# Patient Record
Sex: MALE | Race: BLACK OR AFRICAN AMERICAN | Employment: FULL TIME | URBAN - METROPOLITAN AREA
[De-identification: names, ages, dates, MRNs, and addresses within clinical notes are randomized per-mention and may not be internally consistent; named-entity substitution may affect disease eponyms.]

---

## 2018-05-08 ENCOUNTER — HOSPITAL ENCOUNTER (EMERGENCY)
Age: 23
Discharge: LEFT BEFORE DISCHARGE | End: 2018-05-08
Attending: EMERGENCY MEDICINE
Payer: SELF-PAY

## 2018-05-08 VITALS
HEART RATE: 86 BPM | TEMPERATURE: 98.6 F | OXYGEN SATURATION: 99 % | SYSTOLIC BLOOD PRESSURE: 152 MMHG | RESPIRATION RATE: 16 BRPM | DIASTOLIC BLOOD PRESSURE: 89 MMHG | WEIGHT: 190 LBS

## 2018-05-08 PROCEDURE — 75810000275 HC EMERGENCY DEPT VISIT NO LEVEL OF CARE

## 2018-05-08 PROCEDURE — 93005 ELECTROCARDIOGRAM TRACING: CPT

## 2018-05-10 LAB
ATRIAL RATE: 92 BPM
CALCULATED P AXIS, ECG09: 56 DEGREES
CALCULATED R AXIS, ECG10: 45 DEGREES
CALCULATED T AXIS, ECG11: 44 DEGREES
DIAGNOSIS, 93000: NORMAL
P-R INTERVAL, ECG05: 164 MS
Q-T INTERVAL, ECG07: 340 MS
QRS DURATION, ECG06: 96 MS
QTC CALCULATION (BEZET), ECG08: 420 MS
VENTRICULAR RATE, ECG03: 92 BPM

## 2022-08-29 ENCOUNTER — HOSPITAL ENCOUNTER (EMERGENCY)
Age: 27
Discharge: HOME | End: 2022-08-29
Attending: STUDENT IN AN ORGANIZED HEALTH CARE EDUCATION/TRAINING PROGRAM
Payer: COMMERCIAL

## 2022-08-29 VITALS
OXYGEN SATURATION: 100 % | BODY MASS INDEX: 27.17 KG/M2 | TEMPERATURE: 98.4 F | DIASTOLIC BLOOD PRESSURE: 81 MMHG | RESPIRATION RATE: 16 BRPM | WEIGHT: 205 LBS | SYSTOLIC BLOOD PRESSURE: 143 MMHG | HEIGHT: 73 IN | HEART RATE: 77 BPM

## 2022-08-29 DIAGNOSIS — L03.213 PRESEPTAL CELLULITIS OF LEFT EYE: Primary | ICD-10-CM

## 2022-08-29 PROCEDURE — 99283 EMERGENCY DEPT VISIT LOW MDM: CPT

## 2022-08-29 RX ORDER — SULFAMETHOXAZOLE AND TRIMETHOPRIM 800; 160 MG/1; MG/1
1 TABLET ORAL 2 TIMES DAILY
Qty: 14 TABLET | Refills: 0 | Status: SHIPPED | OUTPATIENT
Start: 2022-08-29 | End: 2022-09-05

## 2022-08-29 RX ORDER — PROPARACAINE HYDROCHLORIDE 5 MG/ML
1 SOLUTION/ DROPS OPHTHALMIC
Status: DISCONTINUED | OUTPATIENT
Start: 2022-08-29 | End: 2022-08-29 | Stop reason: HOSPADM

## 2022-08-29 RX ORDER — AMOXICILLIN AND CLAVULANATE POTASSIUM 875; 125 MG/1; MG/1
1 TABLET, FILM COATED ORAL 2 TIMES DAILY
Qty: 14 TABLET | Refills: 0 | Status: SHIPPED | OUTPATIENT
Start: 2022-08-29 | End: 2022-09-05

## 2022-08-29 NOTE — ED TRIAGE NOTES
Pt presents with swelling to left eye since last night. Unsure if he was bitten by an insect. Believes the swelling is due to the pain & inflammation from his wisdom tooth; appt with a dentist on 9/14. Has not been taking Benadryl but has taken Tylenol & took one Amoxicillin tablet from his mother. Stated he had an eye issue recently & that caused blurry vision but unrelated to today's complaint.

## 2022-08-29 NOTE — DISCHARGE INSTRUCTIONS
Use ice on for 10 minutes off for 30 minutes as needed to help with swelling in your eye.   Follow-up with your ophthalmologist.

## 2022-08-29 NOTE — ED PROVIDER NOTES
HPI   Patient is a 48-year-old male who presents with a 1 day history of eye pain and swelling. He denies any changes in his vision. He does have current problems with blurry vision to his left eye with astigmatism that he is following with an ophthalmologist with. Over the last day he has noticed worsening swelling in his eye. He denies any known bug bite or lesion. Denies any trauma or injury. Denies any pain when he moves his eye around or changes in his vision. Does not have any fever, sweats or chills. No past medical history on file. No past surgical history on file. No family history on file. Social History     Socioeconomic History    Marital status: SINGLE     Spouse name: Not on file    Number of children: Not on file    Years of education: Not on file    Highest education level: Not on file   Occupational History    Not on file   Tobacco Use    Smoking status: Not on file    Smokeless tobacco: Not on file   Substance and Sexual Activity    Alcohol use: Not on file    Drug use: Not on file    Sexual activity: Not on file   Other Topics Concern    Not on file   Social History Narrative    Not on file     Social Determinants of Health     Financial Resource Strain: Not on file   Food Insecurity: Not on file   Transportation Needs: Not on file   Physical Activity: Not on file   Stress: Not on file   Social Connections: Not on file   Intimate Partner Violence: Not on file   Housing Stability: Not on file         ALLERGIES: Patient has no known allergies.     Review of Systems  Constitutional: No fever  HENT: No ear pain  Eyes: No change in vision  Respiratory: No SOB  Cardio: No chest pain  GI: No blood in stool  : No hematuria  MSK: No back pain  Skin: No rashes  Neuro: No headache    Vitals:    08/29/22 1908   BP: (!) 143/81   Pulse: 77   Resp: 16   Temp: 98.4 °F (36.9 °C)   SpO2: 100%   Weight: 93 kg (205 lb)   Height: 6' 1\" (1.854 m)            Physical Exam   General: No acute distress  Head: Normocephalic, atraumatic  Psych: Cooperative and alert  Eyes: Extraocular motion intact, no conjunctivitis or changes to the eye, pupil reactive equally, obvious swelling noted to upper and lower lids of left eye with some swelling extended onto the bridge of the nose, no obvious breakdown in skin. Right eye within normal limits. ENT: Moist oral mucosa  Neck: Supple  CV: Regular rate and rhythm, no pitting edema, palpable radial pulses  Pulm: Clear breath sounds bilaterally without any wheezing or rhonchi, normal respiratory rate  GI: Normal bowel sounds, soft, non-tender  MSK: Moves all four extremities  Skin: No rashes  Neuro: Alert and conversive    [facility]  Patient is a 79-year-old male who presents with swelling to his left eye. Could be concerning for a preseptal cellulitis however does not have any concerning findings of a septal infection at this time. Did also discuss this could be reactionary to something like a bug bite or medications I would recommend completion of antibiotics and close follow-up with ophthalmology. Patient stable for discharge at this time. Patient is in agreement with the plan to be discharged at this time. All the patient's questions were answered. Patient was given written instructions on the diagnosis, and states understanding of the plan moving forward. We did discuss important signs and symptoms that should prompt quick return to the emergency department. Disposition: Patient was discharged home in stable condition.   They will follow up with ophthalmology    Prescriptions: Augmentin and Bactrim    Diagnosis: Preseptal cellulitis of left eye       Procedures

## 2025-07-18 ENCOUNTER — HOSPITAL ENCOUNTER (EMERGENCY)
Age: 30
Discharge: LEFT BEFORE DISCHARGE | End: 2025-07-18

## 2025-07-18 VITALS
HEART RATE: 58 BPM | OXYGEN SATURATION: 100 % | SYSTOLIC BLOOD PRESSURE: 150 MMHG | BODY MASS INDEX: 36.16 KG/M2 | RESPIRATION RATE: 18 BRPM | DIASTOLIC BLOOD PRESSURE: 82 MMHG | TEMPERATURE: 97.3 F | HEIGHT: 66 IN | WEIGHT: 225 LBS

## 2025-07-18 DIAGNOSIS — H54.62 DECREASED VISION OF LEFT EYE: ICD-10-CM

## 2025-07-18 DIAGNOSIS — H20.052 HYPOPYON OF LEFT EYE: Primary | ICD-10-CM

## 2025-07-18 PROCEDURE — 6360000002 HC RX W HCPCS: Performed by: PHYSICIAN ASSISTANT

## 2025-07-18 PROCEDURE — 90471 IMMUNIZATION ADMIN: CPT | Performed by: PHYSICIAN ASSISTANT

## 2025-07-18 PROCEDURE — 6370000000 HC RX 637 (ALT 250 FOR IP): Performed by: PHYSICIAN ASSISTANT

## 2025-07-18 PROCEDURE — 2500000003 HC RX 250 WO HCPCS: Performed by: PHYSICIAN ASSISTANT

## 2025-07-18 PROCEDURE — 90715 TDAP VACCINE 7 YRS/> IM: CPT | Performed by: PHYSICIAN ASSISTANT

## 2025-07-18 PROCEDURE — 99284 EMERGENCY DEPT VISIT MOD MDM: CPT

## 2025-07-18 RX ORDER — FLUORESCEIN SODIUM 1 MG/MG
1 STRIP OPHTHALMIC
Status: DISCONTINUED | OUTPATIENT
Start: 2025-07-18 | End: 2025-07-19 | Stop reason: HOSPADM

## 2025-07-18 RX ORDER — PROPARACAINE HYDROCHLORIDE 5 MG/ML
1 SOLUTION/ DROPS OPHTHALMIC
Status: COMPLETED | OUTPATIENT
Start: 2025-07-18 | End: 2025-07-18

## 2025-07-18 RX ORDER — CIPROFLOXACIN HYDROCHLORIDE 3.5 MG/ML
1 SOLUTION/ DROPS TOPICAL
Status: DISCONTINUED | OUTPATIENT
Start: 2025-07-18 | End: 2025-07-19 | Stop reason: HOSPADM

## 2025-07-18 RX ADMIN — CIPROFLOXACIN 1 DROP: 3 SOLUTION OPHTHALMIC at 18:49

## 2025-07-18 RX ADMIN — CIPROFLOXACIN 1 DROP: 3 SOLUTION OPHTHALMIC at 21:40

## 2025-07-18 RX ADMIN — CIPROFLOXACIN 1 DROP: 3 SOLUTION OPHTHALMIC at 20:34

## 2025-07-18 RX ADMIN — TETANUS TOXOID, REDUCED DIPHTHERIA TOXOID AND ACELLULAR PERTUSSIS VACCINE, ADSORBED 0.5 ML: 5; 2.5; 8; 8; 2.5 SUSPENSION INTRAMUSCULAR at 18:49

## 2025-07-18 RX ADMIN — PROPARACAINE HYDROCHLORIDE 1 DROP: 5 SOLUTION/ DROPS OPHTHALMIC at 18:31

## 2025-07-18 ASSESSMENT — PAIN SCALES - GENERAL
PAINLEVEL_OUTOF10: 7
PAINLEVEL_OUTOF10: 8
PAINLEVEL_OUTOF10: 9

## 2025-07-18 ASSESSMENT — LIFESTYLE VARIABLES
HOW OFTEN DO YOU HAVE A DRINK CONTAINING ALCOHOL: NEVER
HOW MANY STANDARD DRINKS CONTAINING ALCOHOL DO YOU HAVE ON A TYPICAL DAY: PATIENT DOES NOT DRINK

## 2025-07-18 ASSESSMENT — VISUAL ACUITY
OD: 20/40
OU: UNABLE TO TEST

## 2025-07-18 ASSESSMENT — PAIN - FUNCTIONAL ASSESSMENT: PAIN_FUNCTIONAL_ASSESSMENT: 0-10

## 2025-07-18 NOTE — ED TRIAGE NOTES
Pt states that he was working 2 months ago and got something in his L eye. 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 It has been worsening so he came to the ED today.

## 2025-07-18 NOTE — ED NOTES
Received report from DANA Blake.    Per DANA Blake, patient can not see out his left eye. Patient is going to be transferred to [facility].    Patient is ambulatory and is alert and oriented x4.     Registration also at bedside.

## 2025-07-18 NOTE — ED NOTES
DANA Florentino from [facility] called for the provider to speak with ophthalmology, Dr. Franks. Call transferred to Linda

## 2025-07-18 NOTE — ED PROVIDER NOTES
EMERGENCY DEPARTMENT HISTORY AND PHYSICAL EXAM      Date: 7/18/2025  Patient Name: Salvador Ryan    History of Presenting Illness     Chief Complaint   Patient presents with    Eye Pain       History (Context): Salvador Ryan is a 29 y.o. male with significant PMHx for asthma presents ambulatory to the ED today. Patient reports pain to his left eye for the past month.  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 Patient states he feels like there is something in his eye. Concerned for possible FB. Patient reports worsening pain for the past month with decreased vision. Denies contact lens use. Unsure of last tetanus date. Denies fever, chills, drainage. Patient has not taken anything for symptoms.      PCP: No primary care provider on file.    Current Facility-Administered Medications   Medication Dose Route Frequency Provider Last Rate Last Admin    fluorescein ophthalmic strip 1 mg  1 strip Ophthalmic NOW Linda Dooley PA        ciprofloxacin (CILOXAN) 0.3 % ophthalmic solution 1 drop  1 drop Left Eye Q2H While awake Linda Dooley PA   1 drop at 07/18/25 1849     No current outpatient medications on file.       Past History     Past Medical History:   Past Medical History:   Diagnosis Date    Asthma        Past Surgical History:  History reviewed. No pertinent surgical history.    Family History:  History reviewed. No pertinent family history.    Social History:   Social History     Tobacco Use    Smoking status: Every Day     Current packs/day: 0.25     Types: Cigarettes    Smokeless tobacco: Never   Vaping Use    Vaping status: Never Used   Substance Use Topics    Alcohol use: Never    Drug use: Never       Allergies:  No Known Allergies      Physical Exam     Vitals:    07/18/25 1822   BP: 129/76   Pulse: 74   Resp: 18   Temp: 98.2 °F (36.8 °C)   TempSrc: Oral   SpO2: 100%   Weight: 102.1 kg (225 lb)   Height: 1.676 m (5' 6\")       Physical Exam  Vitals and nursing

## 2025-07-19 NOTE — ED NOTES
TRANSFER - OUT REPORT:    Verbal report given to RN Lourdes Daniels on Salvador Ryan  being transferred to [facility] for routine progression of patient care       Report consisted of patient's Situation, Background, Assessment and   Recommendations(SBAR).     Information from the following report(s) ED Encounter Summary was reviewed with the receiving nurse.    [place] Fall Assessment:    Presents to emergency department  because of falls (Syncope, seizure, or loss of consciousness): No  Age > 70: No  Altered Mental Status, Intoxication with alcohol or substance confusion (Disorientation, impaired judgment, poor safety awaremess, or inability to follow instructions): No  Impaired Mobility: Ambulates or transfers with assistive devices or assistance; Unable to ambulate or transer.: No  Nursing Judgement: No          Lines:       Opportunity for questions and clarification was provided.      Patient transported with:  Patient-specific medications from Pharmacy

## 2025-07-19 NOTE — ED NOTES
Followed up with Salvador Ryan on 7/18/2025 at 11:25 PM. Patient left the ED with a disposition of AMA on . Patient cited personal obligations as reason. Advised patient to follow up with a primary care physician or return to the Emergency Department if symptoms worsen.   Petrona Vu RN     Patient was educated at length with MD Duron at bedside. Patient told this nurse that he will go to [facility] tomorrow and has an eye appointment with a specialist at the end of this week.

## 2025-07-19 NOTE — ED NOTES
Rounded on patient.    Patient sitting on side of bed. Vitals updated and logged for EMTALA. Patient also informed of the purpose of the EMTALA form and signed at this time.    No needs at this time. Call bell within reach.

## 2025-07-19 NOTE — ED NOTES
Patient used call bell to request this nurse. When this nurse arrived in the room, patient stated that he needed to be discharged due to \" and kid\". Informed him that the MD will speak with him about AMA and risks involved.

## 2025-07-19 NOTE — ED NOTES
Rounded on patient. Patient sitting on side of bed. Patient had no needs at this time.    Patient did ask when his transport will arrive and informed him that medical transport is scheduled for 11pm